# Patient Record
Sex: MALE | Race: BLACK OR AFRICAN AMERICAN | NOT HISPANIC OR LATINO | Employment: FULL TIME | URBAN - METROPOLITAN AREA
[De-identification: names, ages, dates, MRNs, and addresses within clinical notes are randomized per-mention and may not be internally consistent; named-entity substitution may affect disease eponyms.]

---

## 2022-04-30 ENCOUNTER — OFFICE VISIT (OUTPATIENT)
Dept: URGENT CARE | Facility: CLINIC | Age: 37
End: 2022-04-30

## 2022-04-30 ENCOUNTER — NURSE TRIAGE (OUTPATIENT)
Dept: ADMINISTRATIVE | Facility: CLINIC | Age: 37
End: 2022-04-30

## 2022-04-30 VITALS
BODY MASS INDEX: 29.78 KG/M2 | HEIGHT: 70 IN | HEART RATE: 62 BPM | WEIGHT: 208 LBS | DIASTOLIC BLOOD PRESSURE: 77 MMHG | SYSTOLIC BLOOD PRESSURE: 117 MMHG | TEMPERATURE: 99 F | OXYGEN SATURATION: 95 % | RESPIRATION RATE: 18 BRPM

## 2022-04-30 DIAGNOSIS — L03.012 PARONYCHIA OF LEFT MIDDLE FINGER: Primary | ICD-10-CM

## 2022-04-30 PROCEDURE — 99203 PR OFFICE/OUTPT VISIT, NEW, LEVL III, 30-44 MIN: ICD-10-PCS | Mod: 25,S$GLB,, | Performed by: NURSE PRACTITIONER

## 2022-04-30 PROCEDURE — 10060 I&D ABSCESS SIMPLE/SINGLE: CPT | Mod: S$GLB,,, | Performed by: NURSE PRACTITIONER

## 2022-04-30 PROCEDURE — 99203 OFFICE O/P NEW LOW 30 MIN: CPT | Mod: 25,S$GLB,, | Performed by: NURSE PRACTITIONER

## 2022-04-30 PROCEDURE — 10060 INCISION & DRAINAGE: ICD-10-PCS | Mod: S$GLB,,, | Performed by: NURSE PRACTITIONER

## 2022-04-30 RX ORDER — MUPIROCIN 20 MG/G
OINTMENT TOPICAL 3 TIMES DAILY
Qty: 30 G | Refills: 0 | Status: SHIPPED | OUTPATIENT
Start: 2022-04-30

## 2022-04-30 NOTE — PATIENT INSTRUCTIONS
- You must understand that you have received an Urgent Care treatment only and that you may be released before all of your medical problems are known or treated.   - You, the patient, will arrange for follow up care as instructed.   - If your condition worsens or fails to improve we recommend that you receive another evaluation at the ER immediately or contact your PCP to discuss your concerns or return here.

## 2022-04-30 NOTE — TELEPHONE ENCOUNTER
Right hand index finger redness, swelling for several days.  Care advice states see a provider within 24 hours.  [facility]  Was offered and accepted.      Reason for Disposition   Wound looks infected   [1] Wound > 48 hours old AND [2] it becomes more tender    Additional Information   Negative: Followed a finger injury   Negative: [1] Widespread rash AND [2] bright red, sunburn-like AND [3] too weak to stand   Negative: [1] Widespread rash AND [2] bright red, sunburn-like   Negative: SEVERE pain in the wound   Negative: Black (necrotic) or blisters develop in wound   Negative: Patient sounds very sick or weak to the triager   Negative: [1] Looks infected (spreading redness, red streak, pus) AND [2] fever   Negative: [1] Red streak runs from the wound AND [2] longer than 1 inch (2.5 cm)   Negative: [1] Skin around the wound has become red AND [2] larger than 2 inches (5 cm)   Negative: [1] Face wound AND [2] looks infected (e.g., spreading redness)   Negative: [1] Finger wound AND [2] entire finger swollen   Negative: [1] Red area or streak AND [2] no fever   Negative: [1] Pus or cloudy fluid draining from wound AND [2] no fever    Protocols used: FINGER PAIN-A-AH, WOUND INFECTION-A-AH

## 2022-04-30 NOTE — PROGRESS NOTES
"Subjective:       Patient ID: Jeremy Escoto is a 36 y.o. male.    Vitals:  height is 5' 10" (1.778 m) and weight is 94.3 kg (208 lb). His temperature is 98.5 °F (36.9 °C). His blood pressure is 117/77 and his pulse is 62. His respiration is 18 and oxygen saturation is 95%.     Chief Complaint: Hand Pain    36-year-old male presents to clinic for evaluation of pain and swelling to left middle finger x2 days.  He denies any injury.  He states that he has attempted to pick at the area, states it only made things worse.  He is awake and alert, answers questions appropriately, has no other complaints, is otherwise healthy on today's visit.    Hand Pain   The incident occurred 2 days ago. There was no injury mechanism. The pain is present in the left fingers. The quality of the pain is described as aching and stabbing. The pain does not radiate. The pain is at a severity of 10/10. The pain is severe. The pain has been constant since the incident. Pertinent negatives include no chest pain or numbness. Nothing aggravates the symptoms. He has tried nothing for the symptoms.       Constitution: Negative for activity change, appetite change, chills, sweating, fatigue and fever.   Cardiovascular: Negative for chest pain.   Respiratory: Negative for shortness of breath.    Gastrointestinal: Negative for nausea and vomiting.   Musculoskeletal: Positive for pain and joint swelling.   Skin: Positive for erythema.   Neurological: Negative for dizziness, numbness and tingling.       Objective:      Physical Exam   Constitutional: He is oriented to person, place, and time. He appears well-developed.  Non-toxic appearance. He does not appear ill. No distress.   HENT:   Head: Normocephalic and atraumatic. Head is without abrasion, without contusion and without laceration.   Ears:   Right Ear: External ear normal.   Left Ear: External ear normal.   Mouth/Throat: Oropharynx is clear and moist and mucous membranes are normal.   Eyes: " Conjunctivae, EOM and lids are normal. Right eye exhibits no discharge. Left eye exhibits no discharge.   Neck: Trachea normal and phonation normal. Neck supple.   Cardiovascular: Normal rate.   Pulmonary/Chest: Effort normal. No respiratory distress.   Abdominal: Normal appearance.   Musculoskeletal: Normal range of motion.         General: Swelling and tenderness present. Normal range of motion.      Left hand: Left middle finger: Exhibits tenderness and swelling.        Hands:    Neurological: He is alert and oriented to person, place, and time.   Skin: Skin is warm, dry, intact, not diaphoretic and no rash. Capillary refill takes less than 2 seconds. erythema No abrasion, No burn, No bruising and No ecchymosis   Psychiatric: His speech is normal and behavior is normal. Mood, judgment and thought content normal.   Nursing note and vitals reviewed.  Incision & Drainage    Date/Time: 4/30/2022 9:30 AM  Performed by: George Morrissey NP  Authorized by: George Morrissey NP     Consent Done?:  Yes (Verbal)    Type:  Abscess  Body area:  Upper extremity  Location details:  Left long finger  Local anesthetic: LET (lido,epi,tetracaine)  Anesthetic total (ml):  1  Scalpel size: 18g needle.  Complexity:  Simple  Drainage:  Pus and bloody  Drainage amount:  Scant  Wound treatment:  Expression of material  Patient tolerance:  Patient tolerated the procedure well with no immediate complications          Assessment:       1. Paronychia of left middle finger          Plan:         Paronychia of left middle finger  -     mupirocin (BACTROBAN) 2 % ointment; Apply topically 3 (three) times daily.  Dispense: 30 g; Refill: 0  -     Incision & Drainage    - Paronychia drained in clinic, patient tolerated well.  Discussed warm water soaks, apply mupirocin ointment 3 times a day.  Monitor for increased pain, swelling, redness.  Return to clinic as needed.  Follow-up with PCP.  Patient verbalized understanding and is in agreement with  plan.    Patient Instructions   - You must understand that you have received an Urgent Care treatment only and that you may be released before all of your medical problems are known or treated.   - You, the patient, will arrange for follow up care as instructed.   - If your condition worsens or fails to improve we recommend that you receive another evaluation at the ER immediately or contact your PCP to discuss your concerns or return here.